# Patient Record
(demographics unavailable — no encounter records)

---

## 2025-05-08 NOTE — PLAN
[FreeTextEntry1] : Pap Mammo Pelvic sono Hormone profile as above Follow up to discuss all results none

## 2025-05-08 NOTE — HISTORY OF PRESENT ILLNESS
[N] : Patient denies prior pregnancies [Currently Active] : currently active [Men] : men [No] : No [FreeTextEntry1] : 41 yr old P0 here for annual exam. Periods have been irregular and heavy. LMP January. This is longest time she has skipped. Feels fine.  [Mammogramdate] : 10/23 [TextBox_19] : Z&P [PapSmeardate] : 2023 [LMPDate] : 01/15/25

## 2025-05-22 NOTE — HISTORY OF PRESENT ILLNESS
[FreeTextEntry1] : 41 yr old P0 LMP January here to review blood work and pelvic sono. Blood work shows abnormal ratio LH to FSH. Pelvic sono with 5 cm fibroid and ET thickened at 1.2 cm. Discussed need for endometrial biopsy. Patient agrees and will set up and give cytotec prior to procedure.

## 2025-05-22 NOTE — PLAN
[FreeTextEntry1] : Thickened endometrium likely secondary to amenorrhea but must rule out pathology EMB scheduled Cytotec prior to procedure.